# Patient Record
Sex: FEMALE | Race: AMERICAN INDIAN OR ALASKA NATIVE
[De-identification: names, ages, dates, MRNs, and addresses within clinical notes are randomized per-mention and may not be internally consistent; named-entity substitution may affect disease eponyms.]

---

## 2018-03-20 ENCOUNTER — HOSPITAL ENCOUNTER (EMERGENCY)
Dept: HOSPITAL 31 - C.ER | Age: 43
Discharge: HOME | End: 2018-03-20
Payer: MEDICAID

## 2018-03-20 VITALS
RESPIRATION RATE: 16 BRPM | OXYGEN SATURATION: 97 % | HEART RATE: 87 BPM | DIASTOLIC BLOOD PRESSURE: 92 MMHG | SYSTOLIC BLOOD PRESSURE: 160 MMHG

## 2018-03-20 VITALS — TEMPERATURE: 98.2 F

## 2018-03-20 DIAGNOSIS — R42: Primary | ICD-10-CM

## 2018-03-20 DIAGNOSIS — F41.9: ICD-10-CM

## 2018-03-20 DIAGNOSIS — E11.9: ICD-10-CM

## 2018-03-20 DIAGNOSIS — I10: ICD-10-CM

## 2018-03-20 LAB
ALBUMIN SERPL-MCNC: 3.7 G/DL (ref 3.5–5)
ALBUMIN/GLOB SERPL: 0.7 {RATIO} (ref 1–2.1)
ALT SERPL-CCNC: 76 U/L (ref 9–52)
AST SERPL-CCNC: 108 U/L (ref 14–36)
BACTERIA #/AREA URNS HPF: (no result) /[HPF]
BASOPHILS # BLD AUTO: 0.1 K/UL (ref 0–0.2)
BASOPHILS NFR BLD: 1.3 % (ref 0–2)
BILIRUB UR-MCNC: NEGATIVE MG/DL
BUN SERPL-MCNC: 10 MG/DL (ref 7–17)
CALCIUM SERPL-MCNC: 8.8 MG/DL (ref 8.6–10.4)
EOSINOPHIL # BLD AUTO: 0.3 K/UL (ref 0–0.7)
EOSINOPHIL NFR BLD: 4.7 % (ref 0–4)
ERYTHROCYTE [DISTWIDTH] IN BLOOD BY AUTOMATED COUNT: 13.9 % (ref 11.5–14.5)
GFR NON-AFRICAN AMERICAN: > 60
GLUCOSE UR STRIP-MCNC: (no result) MG/DL
HGB BLD-MCNC: 14.4 G/DL (ref 11–16)
HYALINE CASTS #/AREA URNS LPF: (no result) /LPF (ref 0–2)
LEUKOCYTE ESTERASE UR-ACNC: (no result) LEU/UL
LYMPHOCYTES # BLD AUTO: 3.4 K/UL (ref 1–4.3)
LYMPHOCYTES NFR BLD AUTO: 47.3 % (ref 20–40)
MCH RBC QN AUTO: 29.7 PG (ref 27–31)
MCHC RBC AUTO-ENTMCNC: 34 G/DL (ref 33–37)
MCV RBC AUTO: 87.4 FL (ref 81–99)
MONOCYTES # BLD: 0.6 K/UL (ref 0–0.8)
MONOCYTES NFR BLD: 8.5 % (ref 0–10)
NEUTROPHILS # BLD: 2.7 K/UL (ref 1.8–7)
NEUTROPHILS NFR BLD AUTO: 38.2 % (ref 50–75)
NRBC BLD AUTO-RTO: 0.1 % (ref 0–2)
PH UR STRIP: 5 [PH] (ref 5–8)
PLATELET # BLD: 83 K/UL (ref 130–400)
PMV BLD AUTO: 11.4 FL (ref 7.2–11.7)
PROT UR STRIP-MCNC: (no result) MG/DL
RBC # BLD AUTO: 4.85 MIL/UL (ref 3.8–5.2)
RBC # UR STRIP: (no result) /UL
SP GR UR STRIP: 1.03 (ref 1–1.03)
SQUAMOUS EPITHIAL: 4 /HPF (ref 0–5)
UROBILINOGEN UR-MCNC: 2 MG/DL (ref 0.2–1)
WBC # BLD AUTO: 7.1 K/UL (ref 4.8–10.8)

## 2018-03-20 PROCEDURE — 83735 ASSAY OF MAGNESIUM: CPT

## 2018-03-20 PROCEDURE — 83992 ASSAY FOR PHENCYCLIDINE: CPT

## 2018-03-20 PROCEDURE — 70450 CT HEAD/BRAIN W/O DYE: CPT

## 2018-03-20 PROCEDURE — 96366 THER/PROPH/DIAG IV INF ADDON: CPT

## 2018-03-20 PROCEDURE — 80320 DRUG SCREEN QUANTALCOHOLS: CPT

## 2018-03-20 PROCEDURE — 99285 EMERGENCY DEPT VISIT HI MDM: CPT

## 2018-03-20 PROCEDURE — 96365 THER/PROPH/DIAG IV INF INIT: CPT

## 2018-03-20 PROCEDURE — 71046 X-RAY EXAM CHEST 2 VIEWS: CPT

## 2018-03-20 PROCEDURE — 96361 HYDRATE IV INFUSION ADD-ON: CPT

## 2018-03-20 PROCEDURE — 93005 ELECTROCARDIOGRAM TRACING: CPT

## 2018-03-20 PROCEDURE — 80324 DRUG SCREEN AMPHETAMINES 1/2: CPT

## 2018-03-20 PROCEDURE — 80353 DRUG SCREENING COCAINE: CPT

## 2018-03-20 PROCEDURE — 80345 DRUG SCREENING BARBITURATES: CPT

## 2018-03-20 PROCEDURE — 81001 URINALYSIS AUTO W/SCOPE: CPT

## 2018-03-20 PROCEDURE — 96375 TX/PRO/DX INJ NEW DRUG ADDON: CPT

## 2018-03-20 PROCEDURE — 84484 ASSAY OF TROPONIN QUANT: CPT

## 2018-03-20 PROCEDURE — 84439 ASSAY OF FREE THYROXINE: CPT

## 2018-03-20 PROCEDURE — 80349 CANNABINOIDS NATURAL: CPT

## 2018-03-20 PROCEDURE — 80346 BENZODIAZEPINES1-12: CPT

## 2018-03-20 PROCEDURE — 80361 OPIATES 1 OR MORE: CPT

## 2018-03-20 PROCEDURE — 84443 ASSAY THYROID STIM HORMONE: CPT

## 2018-03-20 PROCEDURE — 80053 COMPREHEN METABOLIC PANEL: CPT

## 2018-03-20 PROCEDURE — 85025 COMPLETE CBC W/AUTO DIFF WBC: CPT

## 2018-03-20 PROCEDURE — 80358 DRUG SCREENING METHADONE: CPT

## 2018-03-20 RX ADMIN — MAGNESIUM SULFATE IN DEXTROSE SCH MLS/HR: 10 INJECTION, SOLUTION INTRAVENOUS at 19:39

## 2018-03-20 RX ADMIN — MAGNESIUM SULFATE IN DEXTROSE SCH MLS/HR: 10 INJECTION, SOLUTION INTRAVENOUS at 20:06

## 2018-03-20 NOTE — RAD
HISTORY:



COMPARISON:

No prior.



TECHNIQUE:

Chest PA and lateral



FINDINGS:



LINES AND TUBES:

None. 



LUNG AND PLEURA:

The lungs are well inflated and clear. No focal consolidation. 



HEART AND MEDIASTINUM:

The heart is not enlarged. There is a left-sided dual lead 

transvenous permanent pacing device. The hilar and mediastinal 

contours are within normal limits.



SKELETAL STRUCTURES:

The bony structures are within normal limits for the patient's age.



VISUALIZED UPPER ABDOMEN:

Normal.



OTHER FINDINGS:

None.



IMPRESSION:

No acute findings.

## 2018-03-20 NOTE — C.PDOC
Addendum entered and electronically signed by Marian Johnson  18 21:10: 








Disposition


Discussed With DrLacy: Raysa Burton


Clinical Impression: 


 Dizziness





Disposition: HOME/ ROUTINE


Disposition Time: 19:00


Condition: STABLE


Additional Instructions: 


Please discharge patient home 


Please continue your home medications as prescribed as your primary care 

physician 


Notes transaminitis on labs, please follow up with your primary care physician 

within this week


Please follow up with your primary care physician, Dr. Durant within this week 


Please return to the ED if symptoms worsens in 2 days 


Stand Alone Forms:  [facility] (English), General Discharge Instructions





Original Note:








History Of Present Illness





<Marian Johnson - Last Filed: 18 18:58>





<Cristóbal Leon - Last Filed: 18 19:03>


Patient is a 43 year old female with past medical history of Type 2 DM, HTN, 

depression and anxiety, thyroid disease, Hepatitis C with liver cirrhosis, Hx 

of heroin use (last use 2 year ago), diabetic neuropathy and defibrillator 

placement for hx of prolong QT,  who presents to ED with complaints of headache

, dizziness, SOB, palpitations and "just not feeling well". [place] 

[facility] [place] and Thursday, 3/15/18; patient was given fluid and 

medication for headache. Patient reports that she had 3 episode of vomiting 

this morning. Patient admits to headache, palpitations, dizziness, chills, 

nausea and vomiting. 


 (Marian Johnson)


History Per: Patient


History/Exam Limitations: no limitations


Onset/Duration Of Symptoms: Days


Current Symptoms Are (Timing): Still Present


Severity: Moderate


Pain Scale Rating Of: 4


Additional History Per: Patient





<Marian Johnson - Last Filed: 18 18:58>





<Cristóbal Leon - Last Filed: 18 19:03>


Time Seen by Provider: 18 17:50


Chief Complaint (Nursing): Flu-like Symptoms





Past Medical History





- Medical History


PMH: Anxiety, Bipolar Disorder, Diabetes, Hepatitis, HTN


Surgical History: Cholecystectomy, 


Other Surgeries: Defibrillator


Family History: States: Diabetes, Hypertension





- Social History


Hx Tobacco Use: Yes (former smoker,>20yrs (1ppd); quit 5 months )


Hx Alcohol Use: No


Hx Substance Use: Yes (Heroin, last use 2 years ago )





- Immunization History


Hx Tetanus Toxoid Vaccination: No


Hx Influenza Vaccination: Yes


Hx Pneumococcal Vaccination: Yes





<Marian Johnson - Last Filed: 18 18:58>


Vital Signs: 





 Last Vital Signs











Temp  98.2 F   18 17:25


 


Pulse  88   18 17:43


 


Resp  18   18 17:25


 


BP  119/58 L  18 17:43


 


Pulse Ox  98   18 19:01














- [facility] Procedures











INJECT/INFUSE NEC (13)











Review Of Systems


Constitutional: Positive for: Chills, Weakness.  Negative for: Fever, Sweats


Eyes: Negative for: Pain


ENT: Negative for: Ear Pain, Ear Discharge


Cardiovascular: Positive for: Palpitations.  Negative for: Chest Pain, Orthopnea

, Paroxysmal Noc. Dyspnea, Edema, Light Headedness, Other


Respiratory: Positive for: Shortness of Breath.  Negative for: Cough, Hemoptysis

, SOB with Excertion, Pleuritic Pain, Sputum, Wheezing


Gastrointestinal: Positive for: Nausea, Vomiting.  Negative for: Abdominal Pain

, Diarrhea, Constipation, Hematochezia, Hematemesis


Musculoskeletal: Positive for: Neck Pain


Neurological: Positive for: Weakness, Dizziness.  Negative for: Numbness, 

Change in Speech, Confusion, Seizures


Psych: Positive for: Anxiety





<Marian Johnson E - Last Filed: 18 18:58>





Physical Exam





- Physical Exam


Appears: No Acute Distress


Skin: Normal Color


Head: Atraumatic, Normacephalic


Eye(s): bilateral: EOMI


Oral Mucosa: Dry


Neck: Normal ROM


Cardiovascular: Rhythm Regular, No Murmur


Respiratory: Other (Poor effort on exam; poor air movement )


Gastrointestinal/Abdominal: Normal Exam, Bowel Sounds, Soft


Extremity: No Tenderness, No Pedal Edema, No Calf Tenderness, Capillary Refill


Extremity: Bilateral: Atraumatic


Neurological/Psych: Oriented x3, Normal Speech





<Marian Johnson E - Last Filed: 18 18:58>





ED Course And Treatment





- Laboratory Results


Result Diagrams: 


 18 18:48





ECG Rhythm: Sinus Rhythm


ECG Interpretation: Abnormal


Interpretation Of ECG: Prolonged QT


Rate From EC


O2 Sat by Pulse Oximetry: 98





- Radiology


CXR: Read By Radiologist


CXR Interpretation: Yes: No Acute Disease ( ), Other (Left sided dual 

transvernous permanent pacing device)





<Marian Johnson - Last Filed: 18 18:58>





- Laboratory Results


Result Diagrams: 


 18 18:48








<Cristóbal Leon - Last Filed: 18 19:03>





Disposition





<Marian Johnson - Last Filed: 18 18:58>





- Disposition


Disposition Time: 19:00





<Cristóbal Leon - Last Filed: 18 19:03>





- Disposition


Condition: STABLE


Forms:  [employer] (English)





- Clinical Impression


Clinical Impression: 


 Dizziness








Physician Patient Turnover


Patient Signed Over To: Raysa Burton


Handoff Comments: pending labs, imaging, reevaluation and disposition





<Cristóbal Leon - Last Filed: 18 19:03>

## 2018-03-20 NOTE — CT
EXAM:

  CT Head Without Intravenous Contrast



EXAM DATE/TIME:

  3/20/2018 6:41 PM



CLINICAL HISTORY:

  43 years old, female; Condition or disease; Headache; Headache not specified



TECHNIQUE:

  Axial computed tomography images of the head/brain without intravenous 

contrast.  All CT scans at this facility use one or more dose reduction 

techniques, viz.: automated exposure control; ma/kV adjustment per patient size 

(including targeted exams where dose is matched to indication; i.e. head); or 

iterative reconstruction technique.



COMPARISON:

  There are no prior studies for comparison.



FINDINGS:

  Brain:  Ventricles are normal in size and configuration. There is no midline 

shift. There are no intra-axial or extra-axial mass lesions or areas of 

hemorrhage. There are no abnormal fluid collections. Gray-white differentiation 

is maintained.

  Ventricles:  See above.

  Bones: Cranial vault is intact.

  Soft tissues:  unremarkable

  Sinuses:  There is no acute sinusitis.

  Ears and mastoids: Middle ears and mastoids are unremarkable

  Orbits:  Orbital contents are unremarkable.



IMPRESSION:  No acute intracranial abnormality

## 2018-03-21 NOTE — CARD
--------------- APPROVED REPORT --------------





EKG Measurement

Heart [id number]

[id number]

[id number]

[id number]

[id number]



<Conclusion>

Normal sinus rhythm

Prolonged QT

Abnormal ECG